# Patient Record
(demographics unavailable — no encounter records)

---

## 2025-07-21 NOTE — HISTORY OF PRESENT ILLNESS
[de-identified] : 34 yr old female here for CPE today.  Reports stress related to work and requesting a therapist. Also has fatigue, sister was diagnosed with iron deficiency.

## 2025-07-21 NOTE — HEALTH RISK ASSESSMENT
[Good] : ~his/her~  mood as  good [No] : No [Several Days (1)] : 6.) Feeling bad about yourself, or that you are a failure, or have let yourself or your family down? Several days [Not at All (0)] : 9.) Thoughts that you would be off dead or of hurting yourself in some way? Not at all [Mild] : Severity of Depression is Mild [Somewhat Difficult] : How difficult have these problems made it for you to do your work, take care of things at home, or get along with people? Somewhat difficult [Never] : Never [With Family] : lives with family [Employed] : employed [Single] : single [Feels Safe at Home] : Feels safe at home [Fully functional (bathing, dressing, toileting, transferring, walking, feeding)] : Fully functional (bathing, dressing, toileting, transferring, walking, feeding) [Fully functional (using the telephone, shopping, preparing meals, housekeeping, doing laundry, using] : Fully functional and needs no help or supervision to perform IADLs (using the telephone, shopping, preparing meals, housekeeping, doing laundry, using transportation, managing medications and managing finances) [[id number]] : 5 [Patient reported PAP Smear was normal] : Patient reported PAP Smear was normal [None] : None [Reports changes in hearing] : Reports no changes in hearing [Reports changes in vision] : Reports no changes in vision [Reports changes in dental health] : Reports no changes in dental health [PapSmearDate] : 07/24

## 2025-07-21 NOTE — PLAN
[FreeTextEntry1] : Check labs  referral placed. Has appt for GYN in August, advised starting screening for breast cancer, grandmother with breast cancer history. She wishes to discuss with GYN.